# Patient Record
(demographics unavailable — no encounter records)

---

## 2025-02-26 NOTE — HISTORY OF PRESENT ILLNESS
[FreeTextEntry1] : 7 year old female with scholastic difficulties. Pt has problems with reading skills, distractible at times. In a 2nd grade class. Recent eye exam was unremarkable. Walked at 18 months. Sentences by 2 yr old. PMH -ve. On no meds. NKA. FMH +ve for epilepsy in father. No FMH of ASD. Pt in process of completing a school team evaluation. Birth: FTNSVD no complications.

## 2025-02-26 NOTE — DISCUSSION/SUMMARY
[FreeTextEntry1] : Rule out LD +/- ADHD. Will get EEG, BEKA and Neuropsych evaluation. RTO prn. Note sent to JC Pete(PCP). Total clinician time spent on 2/26/2025 is 48 minutes including preparing to see the patient, obtaining and/or reviewing and confirming history, performing a medically necessary and appropriate examination, counseling and educating the patient and/or family, documenting clinical information in the EHR and communicating and/or referring to other healthcare professionals.

## 2025-02-26 NOTE — CONSULT LETTER
[Dear  ___] : Dear ~NNEKA, [Please see my note below.] : Please see my note below. [Sincerely,] : Sincerely, [FreeTextEntry1] : Thank you for sending  SOOJAYLA BA  to me for neurological evaluation. This is an initial encounter with a new pt. [FreeTextEntry3] : Dr Ruffin